# Patient Record
Sex: MALE | Race: WHITE
[De-identification: names, ages, dates, MRNs, and addresses within clinical notes are randomized per-mention and may not be internally consistent; named-entity substitution may affect disease eponyms.]

---

## 2019-08-19 ENCOUNTER — HOSPITAL ENCOUNTER (OUTPATIENT)
Dept: HOSPITAL 39 - LAB.O | Age: 59
End: 2019-08-19
Attending: NURSE PRACTITIONER
Payer: COMMERCIAL

## 2019-08-19 DIAGNOSIS — E11.9: Primary | ICD-10-CM

## 2019-08-22 ENCOUNTER — HOSPITAL ENCOUNTER (OUTPATIENT)
Dept: HOSPITAL 39 - LAB.O | Age: 59
End: 2019-08-22
Attending: NURSE PRACTITIONER
Payer: COMMERCIAL

## 2019-08-22 DIAGNOSIS — E11.65: Primary | ICD-10-CM

## 2021-01-17 NOTE — RAD
EXAM: Chest,2 Views



CLINICAL INDICATION: 60-year-old male status post fall from

ladder two weeks ago.



TECHNIQUE: Two-view, PA and lateral projections of the chest were

obtained.



COMPARISON: None.



FINDINGS: 



 Unremarkable cardiac and mediastinal silhouette. Heart size is

normal. 

Lungs are clear without focal opacity, pneumothorax or pleural

effusions.



There appears to be slight irregularity of the lateral sixth and

seventh ribs raising the possibility of nondisplaced fracture.

Please correlate with patient site of pain.



Age-indeterminate compression fracture of the approximately

thoracic vertebrae.



The visualized bones are otherwise within normal limits, of chest

radiograph technique, however if there is clinical concern for

bone injury, dedicated rib series or CT chest is recommended. 





IMPRESSION:  

1. No acute cardiopulmonary abnormalities.

2. There appears to be slight irregularity of the lateral sixth

and seventh ribs raising the possibility of nondisplaced

fracture. Please correlate with patient site of pain. Further

evaluation with dedicated rib series or CT chest may be

considered.

3. Age-indeterminate compression fracture of the approximately

thoracic vertebrae.



Electronically signed by:  Pamela Dye MD  1/17/2021 8:11 PM [facility]

Workstation: [id number]

## 2021-01-17 NOTE — ED.PDOC
History of Present Illness





- General


Chief Complaint: Trauma


Stated Complaint: fall X 2weeks, right hip and lower back pain


Time Seen by Provider: 01/17/21 18:14


Source: patient


Exam Limitations: no limitations





- History of Present Illness


Initial Comments: 





The patient is a 60-year-old  male with a known history of cirrhosis 

presenting secondary to pain that he has had since he fell off of a ladder 2 

weeks ago.  He is reporting pain around the upper lumbar area.  Is reporting 

pain in both ankles.  He is reporting some pain to the right anterior rib cage. 

And he has some hip pain or groin pain when he is walking on the right.  He has 

been ambulatory since that time.  The patient does obviously have some cirrhosis

and has some fluid overload currently.  He does still drink.  He has been 

informed not to do that.  No evidence of any fever.  No evidence of any 

jaundice.  No real abdominal pain.  Normal range of motion of the extremities.  

No step-off on the back.  No pain in the upper spine.  No neck pain.  No loss of

consciousness.  No head pain.


Timing/Duration: other - 2 weeks


Severity: moderate


Improving Factors: immobilization


Worsening Factors: movement


Associated Symptoms: denies symptoms


Allergies/Adverse Reactions: 


Allergies





NO KNOWN ALLERGY Allergy (Verified 01/17/21 18:38)


   





Home Medications: 


Ambulatory Orders





Cyclobenzaprine HCl [Flexeril] 5 mg PO TID PRN #30 tab 01/17/21 


Metformin HCl [Metformin Hydrochloride E] 500 mg PO 01/17/21 











Review of Systems





- Review of Systems


Constitutional: States: no symptoms reported


EENTM: States: no symptoms reported


Respiratory: States: no symptoms reported


Cardiology: States: chest pain


Gastrointestinal/Abdominal: States: no symptoms reported


Genitourinary: States: no symptoms reported


Musculoskeletal: States: see HPI, back pain


Skin: States: no symptoms reported


Neurological: States: no symptoms reported


Endocrine: States: no symptoms reported


All other Systems: No Change from Baseline





Past Medical History (General)





- Patient Medical History


Hx Seizures: No


Hx Stroke: No


Hx Dementia: No


Hx Asthma: No


Hx of COPD: No


Hx Cardiac Disorders: No


Hx Congestive Heart Failure: No


Hx Pacemaker: No


Hx Hypertension: Yes


Hx Thyroid Disease: No


Hx Diabetes: Yes


Hx Gastroesophageal Reflux: No


Hx Renal Disease: No


Hx Cancer: No


Hx of HIV: No


Hx Hepatitis C: No


Hx MRSA: No


Surgical History: no surgical history





- Vaccination History


Hx Tetanus, Diphtheria Vaccination: Yes


Hx Influenza Vaccination: Yes


Hx Pneumococcal Vaccination: No





- Social History


Hx Tobacco Use: Yes


Hx Alcohol Use: Yes





Family Medical History





- Family History


  ** Mother


Family History: No Known


Living Status: Unknown





Physical Exam





- Physical Exam


General Appearance: Alert, No apparent distress, Other - The patient ambulated 

in


Eye Exam: bilateral normal


Ears, Nose, Throat: hearing grossly normal, normal pharynx


Neck: full range of motion, supple


Respiratory: lungs clear, normal breath sounds, no respiratory distress, no 

accessory muscle use, other - Right anterior mid to lower chest wall discomfort 

to palpation.  No palpable deformity.  No visible bruise.


Cardiovascular/Chest: normal peripheral pulses, regular rate, rhythm, other - +1

edema to bilateral lower extremities


Gastrointestinal/Abdominal: non tender - Ascites is obvious.  No tenderness to 

palpation., soft


Rectal Exam: deferred


Back Exam: no vertebral tenderness, other - He does have some paraspinal muscle 

spasm adjacent to T11 down through L2.  Bilaterally.


Extremity: normal range of motion, non-tender, normal inspection, no pedal 

edema, normal capillary refill


Neurologic: CNs II-XII nml as tested, alert, normal mood/affect, oriented x 3


Skin Exam: normal color


Comments: 





                               Vital Signs - 24 hr











  01/17/21 01/17/21 01/17/21





  18:25 19:30 20:00


 


Temperature 99.2 F  


 


Pulse Rate [ 98 H 94 H 93 H





Left Radial]   


 


Respiratory 20 20 16





Rate   


 


Blood Pressure 143/86 125/96 128/88





[Right Arm]   


 


O2 Sat by Pulse 96 96 94 L





Oximetry   














Progress





- Progress


Progress: 





01/17/21 20:57


The patient is a 60-year-old  male that fell from a ladder 2 weeks ago.

 He has sustained a right sixth and seventh nondisplaced rib fracture, right 

inferior and superior pubic rami fractures as well.  These fractures are simply 

going to take time to heal.  He simply needs to avoid reinjuring them.  He will 

be written for some Flexeril for as needed use and he can take 2 Aleve twice 

daily with food.  He does need to ambulate carefully to prevent any further fall

s that could worsen the injuries.  He does need to take deep breaths and make 

himself cough in order to keep fluid out of the lungs to prevent any pneumonia 

from setting down.  Additionally the patient does have some alcoholic hepatitis 

and cirrhosis giving him ascites.  Liver function tests are moderately elevated 

today.  The ascites seems to be most likely what is causing the pain around his 

ankles and feet.  He does need to reduce his alcohol intake markedly if not 

stop.  The patient is going to be placed on 5 days of Lasix to help reduce the 

ascites and the edema.  I do want him to follow back up with his primary care 

doctor with my partner this week for follow-up of this problem.  It will require

longer term management.  In the long run he may benefit from a low-dose of 

something along the lines of Neurontin or Lyrica.  ER warnings are given.





sherwin de la cruz [id number]





- Results/Orders


Results/Orders: 


X-ray of both ankles show mild chronic degenerative changes along with a heel 

spur.  No obvious acute trauma.





X-ray of the pelvis shows mildly displaced fractures of the right inferior and 

superior pubic rami.





Lumbar x-ray shows chronic degenerative changes and 6 lumbar vertebrae.  No 

acute pathology noted otherwise.





X-ray of the right hip only shows the pubic rami fractures.





Chest x-ray shows nondisplaced fractures of the lateral sixth and seventh ribs 

on the right.  No obvious pulmonary pathology otherwise.  There does appear to 

be what is likely an old compression fracture of the upper thoracic spine, as 

the patient has no pain in that area.








                         Laboratory Results - last 24 hr











  01/17/21 01/17/21 01/17/21





  18:42 18:42 18:44


 


WBC    5.6


 


RBC    4.54 L


 


Hgb    15.0


 


Hct    44.0


 


MCV    97.0 H


 


MCH    33.0 H


 


MCHC    34.0


 


RDW    13.6


 


Plt Count    230


 


MPV    7.1 L


 


Absolute Neuts (auto)    3.80


 


Absolute Lymphs (auto)    1.10


 


Absolute Monos (auto)    0.50


 


Absolute Eos (auto)    0.20


 


Absolute Basos (auto)    0.10


 


Neutrophils %    67.7


 


Lymphocytes %    20.0


 


Monocytes %    8.2


 


Eosinophils %    2.8


 


Basophils %    1.3


 


PT   11.5 H 


 


INR   1.16 H 


 


PTT (SP)   26.9 


 


Sodium  134 L  


 


Potassium  3.8  


 


Chloride  97 L  


 


Carbon Dioxide  25  


 


Anion Gap  15.8  


 


BUN  < 6 L  


 


Creatinine  0.46 L  


 


BUN/Creatinine Ratio  13.0  


 


Random Glucose  154 H  


 


Serum Osmolality  268.6 L  


 


Calcium  8.5  


 


Magnesium  1.6 L  


 


Total Bilirubin  0.8  


 


AST  379 H  


 


ALT  198 H  


 


Alkaline Phosphatase  211 H  


 


Creatine Kinase  75  


 


CK-MB (CK-2)  3.7  


 


CK-MB (CK-2) %  Not Reportable  


 


Troponin I  < 0.02  


 


B-Natriuretic Peptide  < 15.0  


 


Serum Total Protein  7.1  


 


Albumin  3.4  


 


Globulin  3.7 H  


 


Albumin/Globulin Ratio  0.9 L  


 


Amylase  61  


 


Lipase  43  


 


TSH  2.49  














- EKG/XRAY/CT


CT Ordered: No





Departure





- Departure


Clinical Impression: 


 Closed traumatic nondisplaced fracture of rib, Alcoholic hepatitis with ascites





Pubic ramus fracture


Qualifiers:


 Encounter type: initial encounter Fracture type: closed Laterality: right 

Qualified Code(s): S32.591A - Other specified fracture of right pubis, initial 

encounter for closed fracture





Disposition: Discharge to Home or Self Care


Condition: Fair


Departure Forms:  ED Discharge - Pt. Copy, Patient Portal Self Enrollment


Instructions:  DI for Trauma, Rib Fracture (DC), Pelvic Fracture (DC)


Diet: regular diet


Activity: increase activity as tolerated


Referrals: 


Vijay Johnson MD [Primary Care Provider] - 1-2 Weeks


Prescriptions: 


Cyclobenzaprine HCl [Flexeril] 5 mg PO TID PRN #30 tab


 PRN Reason: Muscle Spasms


Home Medications: 


Ambulatory Orders





Cyclobenzaprine HCl [Flexeril] 5 mg PO TID PRN #30 tab 01/17/21 


Metformin HCl [Metformin Hydrochloride E] 500 mg PO 01/17/21 








Additional Instructions: 


The patient is a 60-year-old  male that fell from a ladder 2 weeks ago.

 He has sustained a right sixth and seventh nondisplaced rib fracture, right 

inferior and superior pubic rami fractures as well.  These fractures are simply 

going to take time to heal.  He simply needs to avoid reinjuring them.  He will 

be written for some Flexeril for as needed use and he can take 2 Aleve twice 

daily with food.  He does need to ambulate carefully to prevent any further 

falls that could worsen the injuries.  He does need to take deep breaths and 

make himself cough in order to keep fluid out of the lungs to prevent any 

pneumonia from setting down.  Additionally the patient does have some alcoholic 

hepatitis and cirrhosis giving him ascites.  Liver function tests are moderately

elevated today.  The ascites seems to be most likely what is causing the pain 

around his ankles and feet.  He does need to reduce his alcohol intake markedly 

if not stop.  The patient is going to be placed on 5 days of Lasix to help 

reduce the ascites and the edema.  I do want him to follow back up with his [facility] doctor with my partner this week for follow-up of this problem.  It 

will require longer term management.  In the long run he may benefit from a low-

dose of something along the lines of Neurontin or Lyrica.  ER warnings are 

given.

## 2021-01-17 NOTE — RAD
EXAM: Ankle,Left 2 Views



CLINICAL INDICATION: 52-year-old male status post fall.



TECHNIQUE: Limited two views of the LEFT ankle are obtained in AP

and lateral projection.



COMPARISON: None.



FINDINGS: 

 There is no fracture or dislocation. The joint spaces are

preserved. No soft tissue abnormalities are seen. 



IMPRESSION: No acute radiographic abnormality. 



Electronically signed by:  Pamela Dye MD  1/17/2021 8:08 PM [facility]

Workstation: [id number]

## 2021-01-17 NOTE — RAD
EXAM: Lumbar Spine 3 Views



CLINICAL INDICATION: 60-year-old male status post fall from

ladder two weeks ago.



TECHNIQUE: Three views of the lumbar spine were obtained in AP,

lateral, and spot projections. 



COMPARISON: None.



FINDINGS: There appear to be six lumbar type vertebral levels

with lumbarization of the S1 level.



Alignment of the lumbar spine is within normal limits. There is

no subluxation or fracture deformity. Morphology of the vertebral

bodies and intervertebral disc spaces is compatible with

multilevel degenerative change The remainder of the visualized

bones are within normal limits. 



IMPRESSION: Degenerative change without acute radiographic

abnormality. 



Electronically signed by:  Pamela Dye MD  1/17/2021 8:13 PM [facility]

Workstation: [id number]

## 2021-01-17 NOTE — RAD
2 VIEWS RIGHT HIP

1 VIEW PELVIS



HISTORY: Fall from ladder 2 weeks ago.



COMPARISON: None.



FINDINGS:



There are acute mildly displaced fractures of the right superior

and inferior pubic rami. The sacroiliac joints, hip joints, and

pubic symphysis are preserved. Bowel gas obscures portions of the

sacrum. No foreign bodies are seen.



IMPRESSION:



Acute mildly displaced fractures of the right superior and

inferior pubic rami.



Electronically signed by:  Suhail Guadalupe MD  1/17/2021 8:13 PM [facility]

Workstation: [id number]

## 2021-01-17 NOTE — RAD
EXAM: Ankle,Right 2 Views



CLINICAL INDICATION: 52-year-old male status post fall.



TECHNIQUE: Limited two views of the RIGHT ankle are obtained in

AP and lateral projection.



COMPARISON: None.



FINDINGS: 

 There is no fracture or dislocation. The joint spaces are

preserved. No soft tissue abnormalities are seen. Small plantar

heel spur.



IMPRESSION: No acute radiographic abnormality. 



Electronically signed by:  Pamela Dye MD  1/17/2021 8:08 PM [facility]

Workstation: [id number]

## 2021-01-17 NOTE — RAD
2 VIEWS RIGHT HIP

1 VIEW PELVIS



HISTORY: Fall from ladder 2 weeks ago.



COMPARISON: None.



FINDINGS:



There are acute mildly displaced fractures of the right superior

and inferior pubic rami. The sacroiliac joints, hip joints, and

pubic symphysis are preserved. Bowel gas obscures portions of the

sacrum. No foreign bodies are seen.



IMPRESSION:



Acute mildly displaced fractures of the right superior and

inferior pubic rami.



Electronically signed by:  Suhail Guadalupe MD  1/17/2021 8:13 PM [facility]

Workstation: [id number]